# Patient Record
Sex: FEMALE | Race: BLACK OR AFRICAN AMERICAN | NOT HISPANIC OR LATINO | URBAN - METROPOLITAN AREA
[De-identification: names, ages, dates, MRNs, and addresses within clinical notes are randomized per-mention and may not be internally consistent; named-entity substitution may affect disease eponyms.]

---

## 2018-08-24 ENCOUNTER — APPOINTMENT (OUTPATIENT)
Dept: URBAN - METROPOLITAN AREA CLINIC 211 | Age: 48
Setting detail: DERMATOLOGY
End: 2018-08-28

## 2018-08-24 DIAGNOSIS — L0391 CELLULITIS AND ABSCESS OF UNSPECIFIED SITES: ICD-10-CM

## 2018-08-24 DIAGNOSIS — L0390 CELLULITIS AND ABSCESS OF UNSPECIFIED SITES: ICD-10-CM

## 2018-08-24 PROBLEM — L20.84 INTRINSIC (ALLERGIC) ECZEMA: Status: ACTIVE | Noted: 2018-08-24

## 2018-08-24 PROBLEM — L02.211 CUTANEOUS ABSCESS OF ABDOMINAL WALL: Status: ACTIVE | Noted: 2018-08-24

## 2018-08-24 PROCEDURE — 10060 I&D ABSCESS SIMPLE/SINGLE: CPT

## 2018-08-24 PROCEDURE — OTHER COUNSELING: OTHER

## 2018-08-24 PROCEDURE — OTHER MIPS QUALITY: OTHER

## 2018-08-24 PROCEDURE — OTHER PRESCRIPTION: OTHER

## 2018-08-24 PROCEDURE — OTHER INCISION AND DRAINAGE: OTHER

## 2018-08-24 PROCEDURE — OTHER ORDER TESTS: OTHER

## 2018-08-24 RX ORDER — DOXYCYCLINE HYCLATE 100 MG/1
TABLET, COATED ORAL
Qty: 20 | Refills: 0 | Status: ERX | COMMUNITY
Start: 2018-08-24

## 2018-08-24 ASSESSMENT — LOCATION SIMPLE DESCRIPTION DERM: LOCATION SIMPLE: ABDOMEN

## 2018-08-24 ASSESSMENT — LOCATION DETAILED DESCRIPTION DERM: LOCATION DETAILED: LEFT RIB CAGE

## 2018-08-24 ASSESSMENT — LOCATION ZONE DERM: LOCATION ZONE: TRUNK

## 2018-08-24 NOTE — PROCEDURE: INCISION AND DRAINAGE
Anesthesia Volume In Cc: 0.5
Render Postcare In Note?: No
Suture Text: The incision was partially closed with
Epidermal Sutures: 4-0 Ethilon
Curette Text (Optional): After the contents were expressed a curette was used to partially remove the cyst wall.
Method: lancet
Consent was obtained and risks were reviewed including but not limited to delayed wound healing, infection, need for multiple I and D's, and pain.
Epidermal Closure: simple interrupted
Size Of Lesion In Cm (Optional But May Be Required For Some Insurances): 0
Lesion Type: Abscess
Dressing: dry sterile dressing
Detail Level: Simple
Wound Care: Vaseline
Anesthesia Type: 1% lidocaine with epinephrine
Post-Care Instructions: I reviewed with the patient in detail post-care instructions. Patient should keep wound covered and call the office should any redness, pain, swelling or worsening occur.
Preparation Text: The area was prepped in the usual clean fashion.

## 2018-08-24 NOTE — PROCEDURE: MIPS QUALITY
Quality 226: Preventive Care And Screening: Tobacco Use: Screening And Cessation Intervention: Patient screened for tobacco and never smoked
Detail Level: Detailed
Quality 131: Pain Assessment And Follow-Up: Pain assessment using a standardized tool is documented as negative, no follow-up plan required
Quality 110: Preventive Care And Screening: Influenza Immunization: Influenza Immunization not Administered for Documented Reasons.
Quality 130: Documentation Of Current Medications In The Medical Record: Current Medications Documented
Quality 431: Preventive Care And Screening: Unhealthy Alcohol Use - Screening: Patient screened for unhealthy alcohol use using a single question and scores less than 2 times per year